# Patient Record
Sex: MALE | Race: WHITE
[De-identification: names, ages, dates, MRNs, and addresses within clinical notes are randomized per-mention and may not be internally consistent; named-entity substitution may affect disease eponyms.]

---

## 2022-01-13 ENCOUNTER — HOSPITAL ENCOUNTER (OUTPATIENT)
Dept: HOSPITAL 63 - RAD | Age: 52
End: 2022-01-13
Attending: NURSE PRACTITIONER
Payer: COMMERCIAL
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
## 2022-01-13 DIAGNOSIS — M79.89: ICD-10-CM
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
## 2022-01-13 DIAGNOSIS — M21.831: ICD-10-CM

## 2022-01-13 DIAGNOSIS — S62.336A: Primary | ICD-10-CM

## 2022-01-13 PROCEDURE — 73130 X-RAY EXAM OF HAND: CPT

## 2022-01-13 NOTE — RAD
XR HAND_RIGHT 3 VIEWS



History: Pain, fall.



Comparison: None.



Technique: 3 views of the right hand.



Findings:

Osseous mineralization is normal. There is an apex dorsal angulated transverse fracture of the fifth 
metacarpal distal metaphysis with approximately 5 mm displacement. No significant degenerative change
s. Soft tissue swelling at the ulnar aspect of the hand. Calcific density at the radial base of the t
humb proximal phalanx likely represents sequela of old avulsive injury.



Impression: 

1.  Acute apex dorsal angulated and displaced fracture of the fifth metacarpal neck.



Electronically signed by: Vicente Rodriguez MD (1/13/2022 8:22 AM) [facility]

## 2022-01-21 ENCOUNTER — HOSPITAL ENCOUNTER (OUTPATIENT)
Dept: HOSPITAL 63 - RAD | Age: 52
End: 2022-01-21
Attending: PHYSICIAN ASSISTANT
Payer: COMMERCIAL
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
## 2022-01-21 DIAGNOSIS — S62.336A: Primary | ICD-10-CM
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
## 2022-01-21 DIAGNOSIS — M79.89: ICD-10-CM

## 2022-01-21 PROCEDURE — 73130 X-RAY EXAM OF HAND: CPT

## 2022-01-21 NOTE — RAD
Exam: XR HAND_RIGHT 3 VIEWS

History: Pain follow-up fracture

Comparison: 1/13/2022



Findings:

Osseous mineralization is normal. Redemonstrated apex dorsal angulated transverse fracture of the fif
th metacarpal neck. Unchanged approximately 5 mm displacement at the volar cortex. No significant ashleigh
chika formation. Radial sided and soft tissue swelling.



Impression: 

1.  Redemonstrated angulated displaced fracture of the fifth metacarpal neck. No healing callus yet i
dentified.



Electronically signed by: Vicente Rodriguez MD (1/21/2022 8:35 AM) [id number]

## 2022-01-27 ENCOUNTER — HOSPITAL ENCOUNTER (OUTPATIENT)
Dept: HOSPITAL 63 - RAD | Age: 52
End: 2022-01-27
Attending: PHYSICIAN ASSISTANT
Payer: COMMERCIAL

## 2022-01-27 DIAGNOSIS — S62.336D: Primary | ICD-10-CM
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
## 2022-01-27 PROCEDURE — 73130 X-RAY EXAM OF HAND: CPT

## 2022-01-27 NOTE — RAD
EXAM: Right hand, 3 views.



HISTORY: Fracture.



COMPARISON: 1/21/2022



FINDINGS: 3 views of the right hand are obtained. There has been no change in a displaced and angled 
limited fracture of the distal fifth metacarpal. There is a stable tiny ossicle at the base of the fi
rst proximal phalanx, likely due the sequela of a prior avulsion injury.



IMPRESSION: No change in a distal fifth metacarpal fracture.



Electronically signed by: Stephania Cordero MD (1/27/2022 3:53 PM) [id number]

## 2022-03-04 NOTE — RAD
Study: XR HAND_RIGHT 3 VIEWS



Indication: Fracture follow-up.



Comparison: Most recently on 1/27/2022



Findings:



Unchanged alignment of the fifth metacarpal neck fracture again exhibiting dorsal apex angulation and
 with the metacarpal head deviated in the radial direction. Increasing callus formation though with f
racture cleft still able to be identified. No newly developed fracture. No advanced arthrosis. Small 
focus of mineralization adjacent to the thumb MCP joint is unchanged.



Impression:



Ongoing healing of a distal fifth metacarpal fracture with increasing callus. No change in alignment.




Electronically signed by: IVAN CHAMPAGNE MD (3/4/2022 8:15 AM) [id number]